# Patient Record
Sex: MALE
[De-identification: names, ages, dates, MRNs, and addresses within clinical notes are randomized per-mention and may not be internally consistent; named-entity substitution may affect disease eponyms.]

---

## 2020-04-26 ENCOUNTER — MESSAGE (OUTPATIENT)
Age: 55
End: 2020-04-26

## 2020-05-04 ENCOUNTER — APPOINTMENT (OUTPATIENT)
Dept: DISASTER EMERGENCY | Facility: HOSPITAL | Age: 55
End: 2020-05-04

## 2020-05-05 LAB
SARS-COV-2 IGG SERPL IA-ACNC: 0.1 INDEX
SARS-COV-2 IGG SERPL QL IA: NEGATIVE

## 2020-09-14 ENCOUNTER — EMERGENCY (EMERGENCY)
Age: 55
LOS: 1 days | Discharge: HOME | End: 2020-09-14
Attending: STUDENT IN AN ORGANIZED HEALTH CARE EDUCATION/TRAINING PROGRAM
Payer: COMMERCIAL

## 2020-09-14 VITALS
SYSTOLIC BLOOD PRESSURE: 132 MMHG | OXYGEN SATURATION: 98 % | WEIGHT: 154.98 LBS | HEIGHT: 64 IN | RESPIRATION RATE: 16 BRPM | HEART RATE: 85 BPM | TEMPERATURE: 98 F | DIASTOLIC BLOOD PRESSURE: 92 MMHG

## 2020-09-14 VITALS
DIASTOLIC BLOOD PRESSURE: 82 MMHG | RESPIRATION RATE: 16 BRPM | SYSTOLIC BLOOD PRESSURE: 130 MMHG | HEART RATE: 77 BPM | OXYGEN SATURATION: 100 %

## 2020-09-14 PROCEDURE — 99283 EMERGENCY DEPT VISIT LOW MDM: CPT

## 2020-09-14 RX ORDER — ACETAMINOPHEN 500 MG
975 TABLET ORAL ONCE
Refills: 0 | Status: COMPLETED | OUTPATIENT
Start: 2020-09-14 | End: 2020-09-14

## 2020-09-14 RX ORDER — TETANUS TOXOID, REDUCED DIPHTHERIA TOXOID AND ACELLULAR PERTUSSIS VACCINE, ADSORBED 5; 2.5; 8; 8; 2.5 [IU]/.5ML; [IU]/.5ML; UG/.5ML; UG/.5ML; UG/.5ML
0.5 SUSPENSION INTRAMUSCULAR ONCE
Refills: 0 | Status: DISCONTINUED | OUTPATIENT
Start: 2020-09-14 | End: 2020-09-14

## 2020-09-14 NOTE — ED PROVIDER NOTE - CLINICAL SUMMARY MEDICAL DECISION MAKING FREE TEXT BOX
56 y/o male with pmhx of HTN presenting after a head laceration after walking into an open cabinet door with no fall, blood thinner use, LOC, amnesia, headache, or changes in vision/hearing with superficial 1 cm laceration of right forehead. Irrigate wound and dermabond/steri-strips and d/c with return precautions.

## 2020-09-14 NOTE — ED PROVIDER NOTE - ATTENDING CONTRIBUTION TO CARE
Morales DO: I have personally performed a face to face medical and diagnostic evaluation of the patient. I have discussed with and reviewed the resident's note and agree with the History, ROS, Physical Exam and MDM unless otherwise indicated. A brief summary of my personal evaluation and impression can be found below.    55M hx HTN with right forehead 1cm laceration sustained after hitting forehead on cabinet, no antiplatelet or anticoagulant use, no LOC, no dizziness, no vision changes, no nausea/vomiting, no other injuries, EOMI, b/l PERRLA, heart s1s2, lungs CTA, no spinal ttp, gait wnl. No signs concerning for TBI, low mechanism of injury. Pt states UTD on his tetanus within the last 10 years. Pt instructed on rest and signs of concussion if occurs.

## 2020-09-14 NOTE — ED PROVIDER NOTE - NS ED ROS FT
Gen: Denies fever/chills  CV: Denies chest pain, palpitations  Skin: Bleeding from laceration site on right forehead with associated mild pain at laceration site   Resp: Denies SOB, cough  Endo: Denies sensitivity to heat, cold, increased urination  GI: Denies diarrhea, constipation, nausea, vomiting  Msk: Denies neck pain/stiffness, back pain, LE swelling, extremity pain  : Denies dysuria, increased frequency  Neuro: Denies LOC, amnesia, weakness, seizures, changes in vision/hearing, or headache

## 2020-09-14 NOTE — ED PROVIDER NOTE - ADDITIONAL NOTES AND INSTRUCTIONS:
Patient evaluated in our emergency room and is recommended for rest x 1 day, however, if symptoms worsen the patient should see primary care doctor.

## 2020-09-14 NOTE — ED PROVIDER NOTE - PHYSICAL EXAMINATION
Gen: WDWN, NAD  HEENT: 1 cm superficial laceration with mild active bleeding and mild tenderness to palpation on right forehead; no foreign bodies visualized. No periauricular/periorbital ecchymoses. EOMI, no nasal discharge, mucous membranes moist  CV: RRR, +S1/S2, no M/R/G  Resp: CTAB, no W/R/R  GI: Abdomen soft non-distended, NTTP, no masses  MSK: No neck tenderness with full active and passive ROM. No open wounds, no bruising, no LE edema  Neuro: A&Ox4, following commands, moving all four extremities spontaneously

## 2020-09-14 NOTE — ED PROVIDER NOTE - NSFOLLOWUPINSTRUCTIONS_ED_ALL_ED_FT
You were seen in the ED for a laceration on your head. The laceration did not require any sutures. The wound was cleaned and adhesive glue and steri strips were placed on your forehead to help the wound heal better. Please follow up with your PCP.     A laceration is a cut that goes through all of the layers of the skin and into the tissue that is right under the skin. Some lacerations heal on their own. Others need to be closed with skin adhesive strips, skin glue, stitches (sutures), or staples. Proper laceration care minimizes the risk of infection and helps the laceration to heal better.  If non-absorbable stitches or staples have been placed, they must be taken out within the time frame instructed by your healthcare provider.    SEEK IMMEDIATE MEDICAL CARE IF YOU HAVE ANY OF THE FOLLOWING SYMPTOMS: swelling around the wound, worsening pain, drainage from the wound, red streaking going away from your wound, inability to move finger or toe near the laceration, or discoloration of skin near the laceration.     Contact a health care provider if:  You have neck pain that gets worse or has not improved after 1 week. Lasting (chronic) headaches. Dizziness or balance problems. Nausea. Vision problems. Increased sensitivity to noise or light. Depression or mood swings. Anxiety or irritability. Memory problems. Trouble concentrating or paying attention. Sleep problems. Feeling tired all the time.  Get help right away if: You have: Numbness, tingling, or weakness in your arms or legs. Severe neck pain, especially tenderness in the middle of the back of your neck. Changes in bowel or bladder control. Increasing pain in any area of your body. Swelling in any area of your body, especially your legs. Severe or worsening headaches. Sudden vision loss or double vision. Your eye suddenly becomes red. Your pupil is an odd shape or size.

## 2020-09-14 NOTE — ED ADULT NURSE NOTE - OBJECTIVE STATEMENT
56 yo presents to the ED from home. A&ox4, ambulatory c/o forehead lac. pt reports that he was bending down to get something out of a cabinet, got up and hit forehead on cabinet door above. pt reports small lac, bleeding was controlled. denies any LOC, no a/c use. pt reports mild pain at site. denies any vision changes or HA. pt is well appearing. VSS. 56 yo presents to the ED from work. A&ox4, ambulatory c/o forehead lac. pt reports that he was bending down to get something out of a cabinet, got up and hit forehead on cabinet door above. pt reports small lac, bleeding was controlled. denies any LOC, no a/c use. pt reports mild pain at site. denies any vision changes or HA. pt is well appearing. VSS. plan of care discussed. safety and comfort measures maintained.

## 2020-09-14 NOTE — ED PROVIDER NOTE - OBJECTIVE STATEMENT
54 y/o male with pmhx of HTN presenting after a head laceration. 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 Patient did not fall down and he noticed bleeding from the right side of his forehead. Patient c/o of mild pain at laceration site on right side of forehead but denies blood thinner use, n/v, amnesia, LOC, or changes in vision/hearing. 56 y/o male with pmhx of HTN presenting after a head laceration. 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 Patient did not fall down and he noticed bleeding from the right side of his forehead. Patient c/o of mild pain at laceration site on right side of forehead but denies blood thinner use, headache, neck pain, n/v, amnesia, LOC, or changes in vision/hearing.

## 2020-09-14 NOTE — ED PROVIDER NOTE - PATIENT PORTAL LINK FT
You can access the FollowMyHealth Patient Portal offered by [facility] by registering at the following website: [email or web address]. By joining [facility]’s FollowMyHealth portal, you will also be able to view your health information using other applications (apps) compatible with our system.

## 2020-09-15 PROBLEM — Z00.00 ENCOUNTER FOR PREVENTIVE HEALTH EXAMINATION: Status: ACTIVE | Noted: 2020-09-15

## 2024-11-08 ENCOUNTER — TRANSCRIPTION ENCOUNTER (OUTPATIENT)
Age: 59
End: 2024-11-08

## 2025-01-14 ENCOUNTER — NON-APPOINTMENT (OUTPATIENT)
Age: 60
End: 2025-01-14